# Patient Record
Sex: FEMALE | Race: OTHER | HISPANIC OR LATINO | URBAN - METROPOLITAN AREA
[De-identification: names, ages, dates, MRNs, and addresses within clinical notes are randomized per-mention and may not be internally consistent; named-entity substitution may affect disease eponyms.]

---

## 2024-01-07 ENCOUNTER — EMERGENCY (EMERGENCY)
Facility: HOSPITAL | Age: 19
LOS: 1 days | Discharge: HOME | End: 2024-01-07
Attending: STUDENT IN AN ORGANIZED HEALTH CARE EDUCATION/TRAINING PROGRAM | Admitting: INTERNAL MEDICINE
Payer: MEDICAID

## 2024-01-07 VITALS
DIASTOLIC BLOOD PRESSURE: 68 MMHG | HEART RATE: 83 BPM | RESPIRATION RATE: 17 BRPM | SYSTOLIC BLOOD PRESSURE: 108 MMHG | OXYGEN SATURATION: 98 %

## 2024-01-07 VITALS
WEIGHT: 102.07 LBS | OXYGEN SATURATION: 98 % | SYSTOLIC BLOOD PRESSURE: 122 MMHG | RESPIRATION RATE: 18 BRPM | HEART RATE: 123 BPM | TEMPERATURE: 98 F | DIASTOLIC BLOOD PRESSURE: 86 MMHG

## 2024-01-07 LAB
ALBUMIN SERPL ELPH-MCNC: 4.2 G/DL — SIGNIFICANT CHANGE UP (ref 3.3–5)
ALBUMIN SERPL ELPH-MCNC: 4.2 G/DL — SIGNIFICANT CHANGE UP (ref 3.3–5)
ALP SERPL-CCNC: 87 U/L — SIGNIFICANT CHANGE UP (ref 40–120)
ALP SERPL-CCNC: 87 U/L — SIGNIFICANT CHANGE UP (ref 40–120)
ALT FLD-CCNC: 25 U/L — SIGNIFICANT CHANGE UP (ref 10–45)
ALT FLD-CCNC: 25 U/L — SIGNIFICANT CHANGE UP (ref 10–45)
ANION GAP SERPL CALC-SCNC: 11 MMOL/L — SIGNIFICANT CHANGE UP (ref 5–17)
ANION GAP SERPL CALC-SCNC: 11 MMOL/L — SIGNIFICANT CHANGE UP (ref 5–17)
AST SERPL-CCNC: 17 U/L — SIGNIFICANT CHANGE UP (ref 10–40)
AST SERPL-CCNC: 17 U/L — SIGNIFICANT CHANGE UP (ref 10–40)
BASOPHILS # BLD AUTO: 0.03 K/UL — SIGNIFICANT CHANGE UP (ref 0–0.2)
BASOPHILS # BLD AUTO: 0.03 K/UL — SIGNIFICANT CHANGE UP (ref 0–0.2)
BASOPHILS NFR BLD AUTO: 0.4 % — SIGNIFICANT CHANGE UP (ref 0–2)
BASOPHILS NFR BLD AUTO: 0.4 % — SIGNIFICANT CHANGE UP (ref 0–2)
BILIRUB SERPL-MCNC: 0.4 MG/DL — SIGNIFICANT CHANGE UP (ref 0.2–1.2)
BILIRUB SERPL-MCNC: 0.4 MG/DL — SIGNIFICANT CHANGE UP (ref 0.2–1.2)
BUN SERPL-MCNC: 10 MG/DL — SIGNIFICANT CHANGE UP (ref 7–23)
BUN SERPL-MCNC: 10 MG/DL — SIGNIFICANT CHANGE UP (ref 7–23)
CALCIUM SERPL-MCNC: 9.4 MG/DL — SIGNIFICANT CHANGE UP (ref 8.4–10.5)
CALCIUM SERPL-MCNC: 9.4 MG/DL — SIGNIFICANT CHANGE UP (ref 8.4–10.5)
CHLORIDE SERPL-SCNC: 105 MMOL/L — SIGNIFICANT CHANGE UP (ref 96–108)
CHLORIDE SERPL-SCNC: 105 MMOL/L — SIGNIFICANT CHANGE UP (ref 96–108)
CO2 SERPL-SCNC: 26 MMOL/L — SIGNIFICANT CHANGE UP (ref 22–31)
CO2 SERPL-SCNC: 26 MMOL/L — SIGNIFICANT CHANGE UP (ref 22–31)
CREAT SERPL-MCNC: 0.78 MG/DL — SIGNIFICANT CHANGE UP (ref 0.5–1.3)
CREAT SERPL-MCNC: 0.78 MG/DL — SIGNIFICANT CHANGE UP (ref 0.5–1.3)
EGFR: 112 ML/MIN/1.73M2 — SIGNIFICANT CHANGE UP
EGFR: 112 ML/MIN/1.73M2 — SIGNIFICANT CHANGE UP
EOSINOPHIL # BLD AUTO: 0.24 K/UL — SIGNIFICANT CHANGE UP (ref 0–0.5)
EOSINOPHIL # BLD AUTO: 0.24 K/UL — SIGNIFICANT CHANGE UP (ref 0–0.5)
EOSINOPHIL NFR BLD AUTO: 3.3 % — SIGNIFICANT CHANGE UP (ref 0–6)
EOSINOPHIL NFR BLD AUTO: 3.3 % — SIGNIFICANT CHANGE UP (ref 0–6)
GLUCOSE SERPL-MCNC: 87 MG/DL — SIGNIFICANT CHANGE UP (ref 70–99)
GLUCOSE SERPL-MCNC: 87 MG/DL — SIGNIFICANT CHANGE UP (ref 70–99)
HCG SERPL-ACNC: <1 MIU/ML — SIGNIFICANT CHANGE UP
HCG SERPL-ACNC: <1 MIU/ML — SIGNIFICANT CHANGE UP
HCT VFR BLD CALC: 39 % — SIGNIFICANT CHANGE UP (ref 34.5–45)
HCT VFR BLD CALC: 39 % — SIGNIFICANT CHANGE UP (ref 34.5–45)
HGB BLD-MCNC: 13.2 G/DL — SIGNIFICANT CHANGE UP (ref 11.5–15.5)
HGB BLD-MCNC: 13.2 G/DL — SIGNIFICANT CHANGE UP (ref 11.5–15.5)
IMM GRANULOCYTES NFR BLD AUTO: 0.3 % — SIGNIFICANT CHANGE UP (ref 0–0.9)
IMM GRANULOCYTES NFR BLD AUTO: 0.3 % — SIGNIFICANT CHANGE UP (ref 0–0.9)
LIDOCAIN IGE QN: 38 U/L — SIGNIFICANT CHANGE UP (ref 16–77)
LIDOCAIN IGE QN: 38 U/L — SIGNIFICANT CHANGE UP (ref 16–77)
LYMPHOCYTES # BLD AUTO: 1.5 K/UL — SIGNIFICANT CHANGE UP (ref 1–3.3)
LYMPHOCYTES # BLD AUTO: 1.5 K/UL — SIGNIFICANT CHANGE UP (ref 1–3.3)
LYMPHOCYTES # BLD AUTO: 20.7 % — SIGNIFICANT CHANGE UP (ref 13–44)
LYMPHOCYTES # BLD AUTO: 20.7 % — SIGNIFICANT CHANGE UP (ref 13–44)
MCHC RBC-ENTMCNC: 29.3 PG — SIGNIFICANT CHANGE UP (ref 27–34)
MCHC RBC-ENTMCNC: 29.3 PG — SIGNIFICANT CHANGE UP (ref 27–34)
MCHC RBC-ENTMCNC: 33.8 GM/DL — SIGNIFICANT CHANGE UP (ref 32–36)
MCHC RBC-ENTMCNC: 33.8 GM/DL — SIGNIFICANT CHANGE UP (ref 32–36)
MCV RBC AUTO: 86.5 FL — SIGNIFICANT CHANGE UP (ref 80–100)
MCV RBC AUTO: 86.5 FL — SIGNIFICANT CHANGE UP (ref 80–100)
MONOCYTES # BLD AUTO: 0.72 K/UL — SIGNIFICANT CHANGE UP (ref 0–0.9)
MONOCYTES # BLD AUTO: 0.72 K/UL — SIGNIFICANT CHANGE UP (ref 0–0.9)
MONOCYTES NFR BLD AUTO: 9.9 % — SIGNIFICANT CHANGE UP (ref 2–14)
MONOCYTES NFR BLD AUTO: 9.9 % — SIGNIFICANT CHANGE UP (ref 2–14)
NEUTROPHILS # BLD AUTO: 4.75 K/UL — SIGNIFICANT CHANGE UP (ref 1.8–7.4)
NEUTROPHILS # BLD AUTO: 4.75 K/UL — SIGNIFICANT CHANGE UP (ref 1.8–7.4)
NEUTROPHILS NFR BLD AUTO: 65.4 % — SIGNIFICANT CHANGE UP (ref 43–77)
NEUTROPHILS NFR BLD AUTO: 65.4 % — SIGNIFICANT CHANGE UP (ref 43–77)
NRBC # BLD: 0 /100 WBCS — SIGNIFICANT CHANGE UP (ref 0–0)
NRBC # BLD: 0 /100 WBCS — SIGNIFICANT CHANGE UP (ref 0–0)
PLATELET # BLD AUTO: 223 K/UL — SIGNIFICANT CHANGE UP (ref 150–400)
PLATELET # BLD AUTO: 223 K/UL — SIGNIFICANT CHANGE UP (ref 150–400)
POTASSIUM SERPL-MCNC: 3.4 MMOL/L — LOW (ref 3.5–5.3)
POTASSIUM SERPL-MCNC: 3.4 MMOL/L — LOW (ref 3.5–5.3)
POTASSIUM SERPL-SCNC: 3.4 MMOL/L — LOW (ref 3.5–5.3)
POTASSIUM SERPL-SCNC: 3.4 MMOL/L — LOW (ref 3.5–5.3)
PROT SERPL-MCNC: 7.7 G/DL — SIGNIFICANT CHANGE UP (ref 6–8.3)
PROT SERPL-MCNC: 7.7 G/DL — SIGNIFICANT CHANGE UP (ref 6–8.3)
RBC # BLD: 4.51 M/UL — SIGNIFICANT CHANGE UP (ref 3.8–5.2)
RBC # BLD: 4.51 M/UL — SIGNIFICANT CHANGE UP (ref 3.8–5.2)
RBC # FLD: 12.9 % — SIGNIFICANT CHANGE UP (ref 10.3–14.5)
RBC # FLD: 12.9 % — SIGNIFICANT CHANGE UP (ref 10.3–14.5)
SODIUM SERPL-SCNC: 142 MMOL/L — SIGNIFICANT CHANGE UP (ref 135–145)
SODIUM SERPL-SCNC: 142 MMOL/L — SIGNIFICANT CHANGE UP (ref 135–145)
TROPONIN I, HIGH SENSITIVITY RESULT: <4 NG/L — SIGNIFICANT CHANGE UP
TROPONIN I, HIGH SENSITIVITY RESULT: <4 NG/L — SIGNIFICANT CHANGE UP
WBC # BLD: 7.26 K/UL — SIGNIFICANT CHANGE UP (ref 3.8–10.5)
WBC # BLD: 7.26 K/UL — SIGNIFICANT CHANGE UP (ref 3.8–10.5)
WBC # FLD AUTO: 7.26 K/UL — SIGNIFICANT CHANGE UP (ref 3.8–10.5)
WBC # FLD AUTO: 7.26 K/UL — SIGNIFICANT CHANGE UP (ref 3.8–10.5)

## 2024-01-07 PROCEDURE — 83690 ASSAY OF LIPASE: CPT

## 2024-01-07 PROCEDURE — 84702 CHORIONIC GONADOTROPIN TEST: CPT

## 2024-01-07 PROCEDURE — 93010 ELECTROCARDIOGRAM REPORT: CPT

## 2024-01-07 PROCEDURE — 84484 ASSAY OF TROPONIN QUANT: CPT

## 2024-01-07 PROCEDURE — 85025 COMPLETE CBC W/AUTO DIFF WBC: CPT

## 2024-01-07 PROCEDURE — 93005 ELECTROCARDIOGRAM TRACING: CPT

## 2024-01-07 PROCEDURE — 99285 EMERGENCY DEPT VISIT HI MDM: CPT

## 2024-01-07 PROCEDURE — 80053 COMPREHEN METABOLIC PANEL: CPT

## 2024-01-07 PROCEDURE — 36415 COLL VENOUS BLD VENIPUNCTURE: CPT

## 2024-01-07 PROCEDURE — 99284 EMERGENCY DEPT VISIT MOD MDM: CPT | Mod: 25

## 2024-01-07 RX ORDER — SODIUM CHLORIDE 9 MG/ML
1000 INJECTION INTRAMUSCULAR; INTRAVENOUS; SUBCUTANEOUS ONCE
Refills: 0 | Status: COMPLETED | OUTPATIENT
Start: 2024-01-07 | End: 2024-01-07

## 2024-01-07 RX ADMIN — SODIUM CHLORIDE 1000 MILLILITER(S): 9 INJECTION INTRAMUSCULAR; INTRAVENOUS; SUBCUTANEOUS at 14:15

## 2024-01-07 NOTE — ED PROVIDER NOTE - PHYSICAL EXAMINATION
VITAL SIGNS: I have reviewed nursing notes and confirm.   GEN: Well-developed; well-nourished; in no acute distress. Speaking full sentences.  SKIN: Warm, pink, no rash, no diaphoresis, no cyanosis, well perfused.   HEAD: Normocephalic; atraumatic. No scalp lacerations, no abrasions.  NECK: Supple; non tender.   EYES: Pupils 3mm equal, round, reactive to light and accomodation, conjunctiva and sclera clear.   ENT: No nasal discharge; airway clear. Trachea is midline. Normal dentition.  CV: RRR. S1, S2 normal; no murmurs, gallops, or rubs. Capillary refill < 2 seconds throughout. Distal pulses intact 2+ throughout.  RESP: CTA bilaterally. No wheezes, rales, or rhonchi.   ABD: Normal bowel sounds, soft, non-distended, non-tender, no rebound, no guarding, no rigidity,    MSK: Normal range of motion and movement of all 4 extremities.     BACK: No thoracolumbar midline or paravertebral tenderness.    NEURO: Alert & oriented x 3,  Gait: Fluid. Normal speech and coordination.

## 2024-01-07 NOTE — ED PROVIDER NOTE - OBJECTIVE STATEMENT
18f no pmhx presenting with left sided chest pain x 1 day. Describes left sided chest pain, radiating to back, sharp, mild, lasting few minutes, no exacerbated factors. ROS: Otherwise, (-) known family history of early AMI in first degree relative < 54 y/o, (-) tearing or ripping quality, (-) diaphoresis, (-) exertional component, (-) pleuritic component, (-) positional component, (-) abdominal pain, (-) nausea/vomiting, (-) fevers/chills, (-) recent illness, (-) traumatic injury,  (-) shortness of breath, (-) coughing, (-) prior cardiac history, (-) tobacco, (-) IVDU or cocaine use. Denies recent travel, recent surgery, immobility, extremity swelling, hemoptysis, hormone use, known personal cancer history, or personal/family history of blood clots. 18f no pmhx presenting with left sided chest pain x 1 day. Describes left sided chest pain, radiating to back, sharp, mild, lasting few minutes, no exacerbated factors. ROS: Otherwise, (-) known family history of early AMI in first degree relative < 56 y/o, (-) tearing or ripping quality, (-) diaphoresis, (-) exertional component, (-) pleuritic component, (-) positional component, (-) abdominal pain, (-) nausea/vomiting, (-) fevers/chills, (-) recent illness, (-) traumatic injury,  (-) shortness of breath, (-) coughing, (-) prior cardiac history, (-) tobacco, (-) IVDU or cocaine use. Denies recent travel, recent surgery, immobility, extremity swelling, hemoptysis, hormone use, known personal cancer history, or personal/family history of blood clots.

## 2024-01-07 NOTE — ED ADULT TRIAGE NOTE - SOURCE OF INFORMATION
"Does patient have a Guardian or POA: No  Procedure: Colonoscopy  Diagnosis: Positive colorectal cancer screening using cologuard test:  R19.5  Prep: Miralax    Previous scope: 12/27/2006  Allergies:  ALLERGIES:  Lisinopril-hydrochlorothiazide    Medication Review:   Anticoagulants: This patient does not have an active medication from one of the medication groupers. NSAIDS: Yes  Diabetic Medications: None  Phentermine: No   Antihypertensives: Yes  Iron Supplement:  No   Fish Oil:  No     Medical History Review:  Cardiac Stent Placement: No  Pacemaker NO  Defibrillator: No  Spinal Cord Stimulator: No   BMI over 40: No  Estimated body mass index is 34.4 kg/mÂ² as calculated from the following:    Height as of 11/21/23: 5' 10.5"" (1.791 m). Weight as of 11/21/23: 110.3 kg (243 lb 3.2 oz). Sleep Apnea: No     Family History: Colon Cancer  No    Colonoscopy scheduled per protocol and instructions reviewed, patient verbalized understanding. The patient has been instructed to hold certain medications prior to this procedure and to check with prescribing provider to make sure it would be ok to hold as stated in instructions listed in letter tab.     Procedure Date: 2/8/2024  Procedure Time: 9:00am  Arrival Time: 8:00am  Delivery Instructions: Via Mail          " Patient

## 2024-01-07 NOTE — ED ADULT NURSE NOTE - NSFALLUNIVINTERV_ED_ALL_ED
Bed/Stretcher in lowest position, wheels locked, appropriate side rails in place/Call bell, personal items and telephone in reach/Instruct patient to call for assistance before getting out of bed/chair/stretcher/Non-slip footwear applied when patient is off stretcher/[place] to call system/Physically safe environment - no spills, clutter or unnecessary equipment/Purposeful proactive rounding/Room/bathroom lighting operational, light cord in reach Bed/Stretcher in lowest position, wheels locked, appropriate side rails in place/Call bell, personal items and telephone in reach/Instruct patient to call for assistance before getting out of bed/chair/stretcher/Non-slip footwear applied when patient is off stretcher/[place] to call system/Physically safe environment - no spills, clutter or unnecessary equipment/Purposeful proactive rounding/Room/bathroom lighting operational, light cord in reach

## 2024-01-07 NOTE — ED PROVIDER NOTE - CLINICAL SUMMARY MEDICAL DECISION MAKING FREE TEXT BOX
18f no pmhx presenting with left sided chest pain x 1 day. Describes left sided chest pain, radiating to back, sharp, mild, lasting few minutes, no exacerbated factors. ROS: Otherwise, (-) known family history of early AMI in first degree relative < 54 y/o, (-) tearing or ripping quality, (-) diaphoresis, (-) exertional component, (-) pleuritic component, (-) positional component, (-) abdominal pain, (-) nausea/vomiting, (-) fevers/chills, (-) recent illness, (-) traumatic injury,  (-) shortness of breath, (-) coughing, (-) prior cardiac history, (-) tobacco, (-) IVDU or cocaine use. Denies recent travel, recent surgery, immobility, extremity swelling, hemoptysis, hormone use, known personal cancer history, or personal/family history of blood clots.     History obtained from independent historian: na  External note reviewed: na  DDx: costochondritis likely, rule out ACS low risk.  Decision making, ED course, independent interpretation of imaging studies, and consults:    Patient presenting with non-cardiac chest pain that is not consistent with acute coronary syndrome/NSTEMI based on history and absence of high-risk factors (i.e. not substernal, no exertional component, not relieved with rest, XXX ). Patient with minimal CAD risk factors (including age < 45 years),  Given the timing of the pain to emergency room presentation, plan to send single troponin to evaluate for NSTEMI.   PLAN:  - CBC, CMP, Troponin, PT/PTT/INR, EKG, CXR, Pain control PRN, serial re-assessment  - HEART SCORE calculation w/ Disposition accordingly 18f no pmhx presenting with left sided chest pain x 1 day. Describes left sided chest pain, radiating to back, sharp, mild, lasting few minutes, no exacerbated factors. ROS: Otherwise, (-) known family history of early AMI in first degree relative < 56 y/o, (-) tearing or ripping quality, (-) diaphoresis, (-) exertional component, (-) pleuritic component, (-) positional component, (-) abdominal pain, (-) nausea/vomiting, (-) fevers/chills, (-) recent illness, (-) traumatic injury,  (-) shortness of breath, (-) coughing, (-) prior cardiac history, (-) tobacco, (-) IVDU or cocaine use. Denies recent travel, recent surgery, immobility, extremity swelling, hemoptysis, hormone use, known personal cancer history, or personal/family history of blood clots.     History obtained from independent historian: na  External note reviewed: na  DDx: costochondritis likely, rule out ACS low risk.  Decision making, ED course, independent interpretation of imaging studies, and consults:    Patient presenting with non-cardiac chest pain that is not consistent with acute coronary syndrome/NSTEMI based on history and absence of high-risk factors (i.e. not substernal, no exertional component, not relieved with rest, XXX ). Patient with minimal CAD risk factors (including age < 45 years),  Given the timing of the pain to emergency room presentation, plan to send single troponin to evaluate for NSTEMI.   PLAN:  - CBC, CMP, Troponin, PT/PTT/INR, EKG, CXR, Pain control PRN, serial re-assessment  - HEART SCORE calculation w/ Disposition accordingly

## 2024-01-07 NOTE — ED PROVIDER NOTE - NSFOLLOWUPINSTRUCTIONS_ED_ALL_ED_FT
Dolor en el pecho    El dolor de pecho puede ser causado por muchas condiciones diferentes que pueden ser peligrosas o no. Las causas incluyen acidez de estómago, infecciones pulmonares, ataques cardíacos, coágulos de jani en los pulmones, infecciones de la piel, tensión o daño a los músculos, cartílagos o huesos, etc. Además de los antecedentes y el examen físico, es posible que se realice un electrocardiograma (ECG) u otras pruebas de laboratorio. Se woodruff realizado pruebas para determinar la causa de sousa dolor en el pecho. Jabari un seguimiento con sousa proveedor de atención primaria o con un cardiólogo según las instrucciones.    BUSQUE ATENCIÓN MÉDICA INMEDIATA SI TIENE ALGUNO DE LOS SIGUIENTES SÍNTOMAS: empeoramiento del dolor en el pecho, tos con jani, dolor inexplicable de espalda/johnnie/mandíbula, dolor abdominal intenso, mareos o aturdimiento, desmayos, dificultad para respirar, piel sudorosa o húmeda, vómitos, o latidos cardíacos acelerados. Estos síntomas pueden representar un problema grave que constituye kyaw emergencia. No espere a edgar si los síntomas desaparecen. Obtenga ayuda médica de inmediato. Llame al 911 y no conduzca hasta el hospital.    [place] acetaminofén 650 mg por vía oral cada 6 a 8 horas para controlar el dolor según sea necesario. No exceda los 4000 mg de paracetamol janna un período de 24 horas. El acetaminofén se puede encontrar en muchos medicamentos para el resfriado de venta krystle, así madelyn en medicamentos opioides que se pueden administrar para el dolor.    [place] ibuprofeno (también conocido madelyn MOTRIN o ADVIL) 400 mg por vía oral cada 6 a 8 horas para controlar el dolor según sea necesario con alimentos para evitar malestar estomacal. El ibuprofeno se puede encontrar en muchos medicamentos de venta krystle. No tome ibuprofeno si tiene un trastorno hemorrágico, úlcera estomacal o gastrointestinal o enfermedad hepática.    Si es necesario, puede alternar estos medicamentos para poder alanna un medicamento cada 3 horas. Por ejemplo, al mediodía tome ibuprofeno, luego a las 3 p. m. tome paracetamol y luego a las 6 p. m. tome ibuprofeno.      Chest Pain    Chest pain can be caused by many different conditions which may or may not be dangerous. Causes include heartburn, lung infections, heart attack, blood clot in lungs, skin infections, strain or damage to muscle, cartilage, or bones, etc. In addition to a history and physical examination, an electrocardiogram (ECG) or other lab tests may have been performed to determine the cause of your chest pain. Follow up with your primary care provider or with a cardiologist as instructed.     SEEK IMMEDIATE MEDICAL CARE IF YOU HAVE ANY OF THE FOLLOWING SYMPTOMS: worsening chest pain, coughing up blood, unexplained back/neck/jaw pain, severe abdominal pain, dizziness or lightheadedness, fainting, shortness of breath, sweaty or clammy skin, vomiting, or racing heart beat. These symptoms may represent a serious problem that is an emergency. Do not wait to see if the symptoms will go away. Get medical help right away. Call 911 and do not drive yourself to the hospital.     Take acetaminophen 650 mg orally every 6-8 hours for pain control as needed. Please do not exceed 4,000 mg of acetaminophen during a 24 hours period. Acetaminophen can be found in many over-the-counter cold medications as well as opioid medications that may be given for pain.    Take ibuprofen (also known as MOTRIN or ADVIL) 400 mg orally every 6-8 hours for pain control as needed with food to avoid an upset stomach. Ibuprofen can be found in many over-the-counter medications. Please do not take ibuprofen if you have a bleeding disorder, stomach or gastrointestinal ulcer, or liver disease.    If needed, you can alternate these medications so that you can take one medication every 3 hours. For example, at noon take ibuprofen, then at 3PM take acetaminophen, then at 6PM take ibuprofen. Dolor en el pecho    El dolor de pecho puede ser causado por muchas condiciones diferentes que pueden ser peligrosas o no. Las causas incluyen acidez de estómago, infecciones pulmonares, ataques cardíacos, coágulos de jani en los pulmones, infecciones de la piel, tensión o daño a los músculos, cartílagos o huesos, etc. Además de los antecedentes y el examen físico, es posible que se realice un electrocardiograma (ECG) u otras pruebas de laboratorio. Se woodruff realizado pruebas para determinar la causa de sousa dolor en el pecho. Jabari un seguimiento con sousa proveedor de atención primaria o con un cardiólogo según las instrucciones.    BUSQUE ATENCIÓN MÉDICA INMEDIATA SI TIENE ALGUNO DE LOS SIGUIENTES SÍNTOMAS: empeoramiento del dolor en el pecho, tos con jani, dolor inexplicable de espalda/johnnie/mandíbula, dolor abdominal intenso, mareos o aturdimiento, desmayos, dificultad para respirar, piel sudorosa o húmeda, vómitos, o latidos cardíacos acelerados. Estos síntomas pueden representar un problema grave que constituye kyaw emergencia. No espere a edgar si los síntomas desaparecen. Obtenga ayuda médica de inmediato. Llame al 911 y no conduzca hasta el hospital.    [employer] acetaminofén 650 mg por vía oral cada 6 a 8 horas para controlar el dolor según sea necesario. No exceda los 4000 mg de paracetamol janna un período de 24 horas. El acetaminofén se puede encontrar en muchos medicamentos para el resfriado de venta krystle, así madelyn en medicamentos opioides que se pueden administrar para el dolor.    [employer] ibuprofeno (también conocido madelyn MOTRIN o ADVIL) 400 mg por vía oral cada 6 a 8 horas para controlar el dolor según sea necesario con alimentos para evitar malestar estomacal. El ibuprofeno se puede encontrar en muchos medicamentos de venta krystle. No tome ibuprofeno si tiene un trastorno hemorrágico, úlcera estomacal o gastrointestinal o enfermedad hepática.    Si es necesario, puede alternar estos medicamentos para poder alanna un medicamento cada 3 horas. Por ejemplo, al mediodía tome ibuprofeno, luego a las 3 p. m. tome paracetamol y luego a las 6 p. m. tome ibuprofeno.      Chest Pain    Chest pain can be caused by many different conditions which may or may not be dangerous. Causes include heartburn, lung infections, heart attack, blood clot in lungs, skin infections, strain or damage to muscle, cartilage, or bones, etc. In addition to a history and physical examination, an electrocardiogram (ECG) or other lab tests may have been performed to determine the cause of your chest pain. Follow up with your primary care provider or with a cardiologist as instructed.     SEEK IMMEDIATE MEDICAL CARE IF YOU HAVE ANY OF THE FOLLOWING SYMPTOMS: worsening chest pain, coughing up blood, unexplained back/neck/jaw pain, severe abdominal pain, dizziness or lightheadedness, fainting, shortness of breath, sweaty or clammy skin, vomiting, or racing heart beat. These symptoms may represent a serious problem that is an emergency. Do not wait to see if the symptoms will go away. Get medical help right away. Call 911 and do not drive yourself to the hospital.     Take acetaminophen 650 mg orally every 6-8 hours for pain control as needed. Please do not exceed 4,000 mg of acetaminophen during a 24 hours period. Acetaminophen can be found in many over-the-counter cold medications as well as opioid medications that may be given for pain.    Take ibuprofen (also known as MOTRIN or ADVIL) 400 mg orally every 6-8 hours for pain control as needed with food to avoid an upset stomach. Ibuprofen can be found in many over-the-counter medications. Please do not take ibuprofen if you have a bleeding disorder, stomach or gastrointestinal ulcer, or liver disease.    If needed, you can alternate these medications so that you can take one medication every 3 hours. For example, at noon take ibuprofen, then at 3PM take acetaminophen, then at 6PM take ibuprofen.

## 2024-01-07 NOTE — ED PROVIDER NOTE - PATIENT PORTAL LINK FT
You can access the FollowMyHealth Patient Portal offered by [facility] by registering at the following website: [email or web address]. By joining [employer]’s FollowMyHealth portal, you will also be able to view your health information using other applications (apps) compatible with our system. You can access the FollowMyHealth Patient Portal offered by [facility] by registering at the following website: [email or web address]. By joining [employer]’s FollowMyHealth portal, you will also be able to view your health information using other applications (apps) compatible with our system.

## 2024-01-24 ENCOUNTER — OUTPATIENT (OUTPATIENT)
Dept: OUTPATIENT SERVICES | Facility: HOSPITAL | Age: 19
LOS: 1 days | End: 2024-01-24
Payer: SELF-PAY

## 2024-01-24 ENCOUNTER — APPOINTMENT (OUTPATIENT)
Dept: FAMILY MEDICINE | Facility: HOSPITAL | Age: 19
End: 2024-01-24
Payer: SELF-PAY

## 2024-01-24 VITALS
OXYGEN SATURATION: 98 % | HEART RATE: 94 BPM | DIASTOLIC BLOOD PRESSURE: 73 MMHG | TEMPERATURE: 98 F | SYSTOLIC BLOOD PRESSURE: 106 MMHG | WEIGHT: 105 LBS | RESPIRATION RATE: 16 BRPM | HEIGHT: 60 IN | BODY MASS INDEX: 20.62 KG/M2

## 2024-01-24 DIAGNOSIS — Z00.00 ENCOUNTER FOR GENERAL ADULT MEDICAL EXAMINATION WITHOUT ABNORMAL FINDINGS: ICD-10-CM

## 2024-01-24 DIAGNOSIS — R07.9 CHEST PAIN, UNSPECIFIED: ICD-10-CM

## 2024-01-24 DIAGNOSIS — R51.9 HEADACHE, UNSPECIFIED: ICD-10-CM

## 2024-01-24 PROCEDURE — 93010 ELECTROCARDIOGRAM REPORT: CPT

## 2024-01-24 PROCEDURE — 93005 ELECTROCARDIOGRAM TRACING: CPT

## 2024-01-24 PROCEDURE — G0463: CPT

## 2024-01-28 NOTE — HEALTH RISK ASSESSMENT
[No] : No [0] : 2) Feeling down, depressed, or hopeless: Not at all (0) [PHQ-2 Negative - No further assessment needed] : PHQ-2 Negative - No further assessment needed [Never] : Never [[id number]] : 0

## 2024-01-28 NOTE — REVIEW OF SYSTEMS
[Chest Pain] : chest pain [Palpitations] : palpitations [Leg Claudication] : no leg claudication [Lower Ext Edema] : no lower extremity edema [Orthopnea] : no orthopnea [Paroxysmal Nocturnal Dyspnea] : no paroxysmal nocturnal dyspnea [Headache] : headache [Dizziness] : no dizziness [Fainting] : no fainting [Confusion] : no confusion [Memory Loss] : no memory loss [Unsteady Walking] : no ataxia [Negative] : Heme/Lymph

## 2024-01-28 NOTE — HISTORY OF PRESENT ILLNESS
[FreeTextEntry8] : A 19-year-old female presented to the clinic for a follow-up after a recent visit to the emergency department . The patient reported experiencing frequent episodes of left-sided chest pain, lasting for 2-3 minutes, which are stabbing in nature and radiate to the left upper back. She recently visited the ED for the same issue and was discharged with pain medication and advised to follow up with her primary care physician . Additionally, the patient complained of episodic headaches located in the frontal and temporal areas, lasting for a few days, and improving with Tylenol. The patient has no family history of heart disease, does not smoke, and does not use drugs.

## 2024-02-15 ENCOUNTER — RESULT CHARGE (OUTPATIENT)
Age: 19
End: 2024-02-15

## 2024-02-16 ENCOUNTER — OUTPATIENT (OUTPATIENT)
Dept: OUTPATIENT SERVICES | Facility: HOSPITAL | Age: 19
LOS: 1 days | End: 2024-02-16
Payer: SELF-PAY

## 2024-02-16 ENCOUNTER — APPOINTMENT (OUTPATIENT)
Dept: CARDIOLOGY | Facility: HOSPITAL | Age: 19
End: 2024-02-16
Payer: COMMERCIAL

## 2024-02-16 VITALS
WEIGHT: 104 LBS | OXYGEN SATURATION: 98 % | SYSTOLIC BLOOD PRESSURE: 118 MMHG | TEMPERATURE: 97.7 F | HEART RATE: 69 BPM | DIASTOLIC BLOOD PRESSURE: 69 MMHG | RESPIRATION RATE: 16 BRPM

## 2024-02-16 DIAGNOSIS — R51.9 HEADACHE, UNSPECIFIED: ICD-10-CM

## 2024-02-16 DIAGNOSIS — R07.9 CHEST PAIN, UNSPECIFIED: ICD-10-CM

## 2024-02-16 DIAGNOSIS — Z00.00 ENCOUNTER FOR GENERAL ADULT MEDICAL EXAMINATION WITHOUT ABNORMAL FINDINGS: ICD-10-CM

## 2024-02-16 PROCEDURE — 93010 ELECTROCARDIOGRAM REPORT: CPT

## 2024-02-16 PROCEDURE — 93005 ELECTROCARDIOGRAM TRACING: CPT

## 2024-02-16 PROCEDURE — G0463: CPT

## 2024-03-08 ENCOUNTER — OUTPATIENT (OUTPATIENT)
Dept: OUTPATIENT SERVICES | Facility: HOSPITAL | Age: 19
LOS: 1 days | End: 2024-03-08
Payer: SELF-PAY

## 2024-03-08 ENCOUNTER — RESULT REVIEW (OUTPATIENT)
Age: 19
End: 2024-03-08

## 2024-03-08 DIAGNOSIS — Z00.00 ENCOUNTER FOR GENERAL ADULT MEDICAL EXAMINATION WITHOUT ABNORMAL FINDINGS: ICD-10-CM

## 2024-03-08 DIAGNOSIS — R07.9 CHEST PAIN, UNSPECIFIED: ICD-10-CM

## 2024-03-08 PROCEDURE — 93306 TTE W/DOPPLER COMPLETE: CPT | Mod: 26

## 2024-03-08 PROCEDURE — 93306 TTE W/DOPPLER COMPLETE: CPT

## 2024-03-22 ENCOUNTER — OUTPATIENT (OUTPATIENT)
Dept: OUTPATIENT SERVICES | Facility: HOSPITAL | Age: 19
LOS: 1 days | End: 2024-03-22
Payer: SELF-PAY

## 2024-03-22 ENCOUNTER — APPOINTMENT (OUTPATIENT)
Dept: FAMILY MEDICINE | Facility: HOSPITAL | Age: 19
End: 2024-03-22

## 2024-03-22 ENCOUNTER — RESULT REVIEW (OUTPATIENT)
Age: 19
End: 2024-03-22

## 2024-03-22 VITALS
TEMPERATURE: 97.9 F | DIASTOLIC BLOOD PRESSURE: 70 MMHG | OXYGEN SATURATION: 99 % | WEIGHT: 112 LBS | HEART RATE: 78 BPM | SYSTOLIC BLOOD PRESSURE: 109 MMHG | RESPIRATION RATE: 14 BRPM

## 2024-03-22 DIAGNOSIS — Z00.00 ENCOUNTER FOR GENERAL ADULT MEDICAL EXAMINATION WITHOUT ABNORMAL FINDINGS: ICD-10-CM

## 2024-03-22 DIAGNOSIS — R10.0 ACUTE ABDOMEN: ICD-10-CM

## 2024-03-22 DIAGNOSIS — Z00.00 ENCOUNTER FOR GENERAL ADULT MEDICAL EXAMINATION W/OUT ABNORMAL FINDINGS: ICD-10-CM

## 2024-03-22 DIAGNOSIS — N97.0 FEMALE INFERTILITY ASSOCIATED WITH ANOVULATION: ICD-10-CM

## 2024-03-22 DIAGNOSIS — R30.0 DYSURIA: ICD-10-CM

## 2024-03-22 PROCEDURE — 85025 COMPLETE CBC W/AUTO DIFF WBC: CPT

## 2024-03-22 PROCEDURE — 80053 COMPREHEN METABOLIC PANEL: CPT

## 2024-03-22 PROCEDURE — G0463: CPT

## 2024-03-22 PROCEDURE — 87800 DETECT AGNT MULT DNA DIREC: CPT

## 2024-03-22 PROCEDURE — 87086 URINE CULTURE/COLONY COUNT: CPT

## 2024-03-25 PROBLEM — R30.0 DYSURIA: Status: ACTIVE | Noted: 2024-03-22

## 2024-03-25 LAB
ALBUMIN SERPL ELPH-MCNC: 4.9 G/DL
ALP BLD-CCNC: 97 U/L
ALT SERPL-CCNC: 17 U/L
ANION GAP SERPL CALC-SCNC: 14 MMOL/L
AST SERPL-CCNC: 17 U/L
BACTERIA UR CULT: NORMAL
BASOPHILS # BLD AUTO: 0.04 K/UL
BASOPHILS NFR BLD AUTO: 0.5 %
BILIRUB SERPL-MCNC: 0.3 MG/DL
BILIRUB UR QL STRIP: NORMAL
BUN SERPL-MCNC: 8 MG/DL
CALCIUM SERPL-MCNC: 9.7 MG/DL
CANDIDA VAG CYTO: NOT DETECTED
CHLORIDE SERPL-SCNC: 104 MMOL/L
CLARITY UR: CLEAR
CO2 SERPL-SCNC: 21 MMOL/L
COLLECTION METHOD: NORMAL
CREAT SERPL-MCNC: 0.64 MG/DL
EGFR: 129 ML/MIN/1.73M2
EOSINOPHIL # BLD AUTO: 0.23 K/UL
EOSINOPHIL NFR BLD AUTO: 2.9 %
G VAGINALIS+PREV SP MTYP VAG QL MICRO: NOT DETECTED
GLUCOSE SERPL-MCNC: 86 MG/DL
GLUCOSE UR-MCNC: NORMAL
HCG UR QL: 0.2 EU/DL
HCT VFR BLD CALC: 39.1 %
HGB BLD-MCNC: 12.9 G/DL
HGB UR QL STRIP.AUTO: NORMAL
IMM GRANULOCYTES NFR BLD AUTO: 0.3 %
KETONES UR-MCNC: NORMAL
LEUKOCYTE ESTERASE UR QL STRIP: NORMAL
LYMPHOCYTES # BLD AUTO: 1.77 K/UL
LYMPHOCYTES NFR BLD AUTO: 22.4 %
MAN DIFF?: NORMAL
MCHC RBC-ENTMCNC: 29.8 PG
MCHC RBC-ENTMCNC: 33 GM/DL
MCV RBC AUTO: 90.3 FL
MONOCYTES # BLD AUTO: 0.49 K/UL
MONOCYTES NFR BLD AUTO: 6.2 %
NEUTROPHILS # BLD AUTO: 5.34 K/UL
NEUTROPHILS NFR BLD AUTO: 67.7 %
NITRITE UR QL STRIP: NORMAL
PH UR STRIP: 5.5
PLATELET # BLD AUTO: 241 K/UL
POTASSIUM SERPL-SCNC: 3.8 MMOL/L
PROT SERPL-MCNC: 7.1 G/DL
PROT UR STRIP-MCNC: NORMAL
RBC # BLD: 4.33 M/UL
RBC # FLD: 13.2 %
SODIUM SERPL-SCNC: 138 MMOL/L
SP GR UR STRIP: 1.02
T VAGINALIS VAG QL WET PREP: NOT DETECTED
WBC # FLD AUTO: 7.89 K/UL

## 2024-03-25 NOTE — PHYSICAL EXAM
[No Acute Distress] : no acute distress [Normal Sclera/Conjunctiva] : normal sclera/conjunctiva [No JVD] : no jugular venous distention [No Respiratory Distress] : no respiratory distress  [Normal Rate] : normal rate  [No Accessory Muscle Use] : no accessory muscle use [No Carotid Bruits] : no carotid bruits [Regular Rhythm] : with a regular rhythm [Soft] : abdomen soft [No Abdominal Bruit] : a ~M bruit was not heard ~T in the abdomen [Non Tender] : non-tender [Normal Anterior Cervical Nodes] : no anterior cervical lymphadenopathy [No Spinal Tenderness] : no spinal tenderness [No CVA Tenderness] : no CVA  tenderness [No Joint Swelling] : no joint swelling [Grossly Normal Strength/Tone] : grossly normal strength/tone [No Rash] : no rash [Coordination Grossly Intact] : coordination grossly intact [No Focal Deficits] : no focal deficits [Normal] : affect was normal and insight and judgment were intact

## 2024-03-27 LAB
C TRACH RRNA SPEC QL NAA+PROBE: NOT DETECTED
N GONORRHOEA RRNA SPEC QL NAA+PROBE: NOT DETECTED
SOURCE AMPLIFICATION: NORMAL

## 2024-03-28 ENCOUNTER — OUTPATIENT (OUTPATIENT)
Dept: OUTPATIENT SERVICES | Facility: HOSPITAL | Age: 19
LOS: 1 days | End: 2024-03-28
Payer: SELF-PAY

## 2024-03-28 ENCOUNTER — RESULT REVIEW (OUTPATIENT)
Age: 19
End: 2024-03-28

## 2024-03-28 DIAGNOSIS — N93.0 POSTCOITAL AND CONTACT BLEEDING: ICD-10-CM

## 2024-03-28 DIAGNOSIS — Z00.00 ENCOUNTER FOR GENERAL ADULT MEDICAL EXAMINATION WITHOUT ABNORMAL FINDINGS: ICD-10-CM

## 2024-03-28 PROCEDURE — 76830 TRANSVAGINAL US NON-OB: CPT | Mod: 26

## 2024-03-28 PROCEDURE — 76856 US EXAM PELVIC COMPLETE: CPT

## 2024-03-28 PROCEDURE — 76856 US EXAM PELVIC COMPLETE: CPT | Mod: 26

## 2024-03-28 PROCEDURE — 76830 TRANSVAGINAL US NON-OB: CPT

## 2024-03-29 NOTE — REVIEW OF SYSTEMS
[Dysuria] : dysuria [Negative] : Heme/Lymph [Incontinence] : no incontinence [Nocturia] : no nocturia [Joint Pain] : no joint pain [Joint Stiffness] : no joint stiffness

## 2024-03-29 NOTE — HEALTH RISK ASSESSMENT
[Good] : ~his/her~ current health as good [No] : No [0] : 2) Feeling down, depressed, or hopeless: Not at all (0) [PHQ-2 Negative - No further assessment needed] : PHQ-2 Negative - No further assessment needed [Never] : Never [[id number]] : 0

## 2024-03-29 NOTE — HISTORY OF PRESENT ILLNESS
[FreeTextEntry1] : CPE  [de-identified] : 19 years  old female  with no significant past medical history presents to the clinic for CPE. Pt is complaining of episodic postcoital bleeding and burning while passing urine. Pt denied fever, chills, nausea, vomiting, and pelvic pain. Pt has no other complaints. Pt isd not takiong any medication and denied Allergies to medication or food.

## 2024-04-25 ENCOUNTER — APPOINTMENT (OUTPATIENT)
Dept: FAMILY MEDICINE | Facility: HOSPITAL | Age: 19
End: 2024-04-25

## 2024-04-25 ENCOUNTER — OUTPATIENT (OUTPATIENT)
Dept: OUTPATIENT SERVICES | Facility: HOSPITAL | Age: 19
LOS: 1 days | End: 2024-04-25

## 2024-04-25 VITALS
TEMPERATURE: 98.6 F | OXYGEN SATURATION: 98 % | SYSTOLIC BLOOD PRESSURE: 110 MMHG | HEIGHT: 60 IN | BODY MASS INDEX: 21.01 KG/M2 | HEART RATE: 70 BPM | WEIGHT: 107 LBS | DIASTOLIC BLOOD PRESSURE: 70 MMHG | RESPIRATION RATE: 14 BRPM

## 2024-04-25 DIAGNOSIS — N93.0 POSTCOITAL AND CONTACT BLEEDING: ICD-10-CM

## 2024-04-25 DIAGNOSIS — Z00.00 ENCOUNTER FOR GENERAL ADULT MEDICAL EXAMINATION WITHOUT ABNORMAL FINDINGS: ICD-10-CM

## 2024-04-25 PROCEDURE — G0463: CPT

## 2024-04-29 PROBLEM — N93.0 PCB (POST COITAL BLEEDING): Status: ACTIVE | Noted: 2024-03-22

## 2024-05-01 NOTE — HISTORY OF PRESENT ILLNESS
[FreeTextEntry1] : Post coital bleeding follow-up  [de-identified] : 19 year old female presents to the clinic for postcoital bleeding follow-up. Pt said thats he is not seeing post coital bleeding any more and she is feeling well. US pelvis is wnl and BV GC are negative.  Pt denied pelvic pain , vaginal discharge or dysuria.

## 2025-07-15 ENCOUNTER — EMERGENCY (EMERGENCY)
Facility: HOSPITAL | Age: 20
LOS: 1 days | End: 2025-07-15
Attending: EMERGENCY MEDICINE | Admitting: EMERGENCY MEDICINE
Payer: MEDICAID

## 2025-07-15 VITALS
DIASTOLIC BLOOD PRESSURE: 65 MMHG | TEMPERATURE: 99 F | OXYGEN SATURATION: 99 % | HEIGHT: 62 IN | RESPIRATION RATE: 18 BRPM | WEIGHT: 102.96 LBS | HEART RATE: 95 BPM | SYSTOLIC BLOOD PRESSURE: 97 MMHG

## 2025-07-15 LAB
APPEARANCE UR: CLEAR — SIGNIFICANT CHANGE UP
BILIRUB UR-MCNC: NEGATIVE — SIGNIFICANT CHANGE UP
COLOR SPEC: YELLOW — SIGNIFICANT CHANGE UP
DIFF PNL FLD: ABNORMAL
EPI CELLS # UR: PRESENT
GLUCOSE UR QL: NEGATIVE MG/DL — SIGNIFICANT CHANGE UP
KETONES UR QL: ABNORMAL MG/DL
LEUKOCYTE ESTERASE UR-ACNC: ABNORMAL
NITRITE UR-MCNC: NEGATIVE — SIGNIFICANT CHANGE UP
PH UR: 5.5 — SIGNIFICANT CHANGE UP (ref 5–8)
PROT UR-MCNC: NEGATIVE MG/DL — SIGNIFICANT CHANGE UP
RBC CASTS # UR COMP ASSIST: 1 /HPF — SIGNIFICANT CHANGE UP (ref 0–4)
SP GR SPEC: 1.03 — SIGNIFICANT CHANGE UP (ref 1–1.03)
UROBILINOGEN FLD QL: 1 MG/DL — SIGNIFICANT CHANGE UP (ref 0.2–1)
WBC UR QL: 2 /HPF — SIGNIFICANT CHANGE UP (ref 0–5)

## 2025-07-15 PROCEDURE — 99283 EMERGENCY DEPT VISIT LOW MDM: CPT

## 2025-07-15 PROCEDURE — 99284 EMERGENCY DEPT VISIT MOD MDM: CPT

## 2025-07-15 PROCEDURE — 87086 URINE CULTURE/COLONY COUNT: CPT

## 2025-07-15 PROCEDURE — 81001 URINALYSIS AUTO W/SCOPE: CPT

## 2025-07-15 RX ORDER — PHENAZOPYRIDINE HCL 100 MG
200 TABLET ORAL ONCE
Refills: 0 | Status: COMPLETED | OUTPATIENT
Start: 2025-07-15 | End: 2025-07-15

## 2025-07-15 RX ORDER — CEFUROXIME SODIUM 1.5 G
250 VIAL (EA) INJECTION ONCE
Refills: 0 | Status: COMPLETED | OUTPATIENT
Start: 2025-07-15 | End: 2025-07-15

## 2025-07-15 RX ADMIN — Medication 250 MILLIGRAM(S): at 10:59

## 2025-07-15 RX ADMIN — Medication 200 MILLIGRAM(S): at 11:08

## 2025-07-15 NOTE — ED ADULT NURSE NOTE - OBJECTIVE STATEMENT
20 yr old female to ED for complaints of abdominal pain x 3 days. patient also reports nausea, denies fevers, chills, vomiting, diarrhea.

## 2025-07-15 NOTE — ED ADULT TRIAGE NOTE - CHIEF COMPLAINT QUOTE
Patient to ED from home for complaints of abdominal pain x 3 days. patient also reports nausea, denies fevers, chills, vomiting, diarrhea.

## 2025-07-15 NOTE — ED PROVIDER NOTE - CLINICAL SUMMARY MEDICAL DECISION MAKING FREE TEXT BOX
20-year-old female with no past significant medical history presents with urinary complaints for past couple days and has on intermittent for months, lab, med

## 2025-07-15 NOTE — ED PROVIDER NOTE - CARDIAC, MLM
Please ensure adequate water throughout the day.  Please ensure taking regular potty breaks, about every 2-3h, especially prior to activities. Encourage listening to your body', can try a sticker chart if needing positive reinforcement.     Follow up as needed for re-evaluation as needed, for any urine symptoms or further concerns.    Normal rate, regular rhythm.  Heart sounds S1, S2.  No murmurs, rubs or gallops.

## 2025-07-15 NOTE — ED PROVIDER NOTE - OBJECTIVE STATEMENT
20-year-old female with no past significant medical history presents with urinary complaints for past couple days and has on intermittent for months.  [place].  History of dysuria, urinary urgency and frequency.  No fever or chills.  No vaginal discharge.  Normal bowel movement this morning.  Last menstrual period was 2 weeks ago.

## 2025-07-15 NOTE — ED PROVIDER NOTE - CARE PROVIDER_API CALL
Naomi Dunne  [facility]  [address] [place] [address], [address]  [place] [address]  Phone: [phone]  Fax: [phone]  Follow Up Time: 1-3 Days

## 2025-07-16 ENCOUNTER — RESULT CHARGE (OUTPATIENT)
Age: 20
End: 2025-07-16

## 2025-07-16 ENCOUNTER — APPOINTMENT (OUTPATIENT)
Age: 20
End: 2025-07-16

## 2025-07-16 ENCOUNTER — OUTPATIENT (OUTPATIENT)
Dept: OUTPATIENT SERVICES | Facility: HOSPITAL | Age: 20
LOS: 1 days | End: 2025-07-16
Payer: SELF-PAY

## 2025-07-16 VITALS
TEMPERATURE: 98 F | DIASTOLIC BLOOD PRESSURE: 62 MMHG | WEIGHT: 108 LBS | RESPIRATION RATE: 16 BRPM | SYSTOLIC BLOOD PRESSURE: 95 MMHG | OXYGEN SATURATION: 98 % | HEART RATE: 60 BPM

## 2025-07-16 DIAGNOSIS — Z00.00 ENCOUNTER FOR GENERAL ADULT MEDICAL EXAMINATION WITHOUT ABNORMAL FINDINGS: ICD-10-CM

## 2025-07-16 DIAGNOSIS — R10.33 PERIUMBILICAL PAIN: ICD-10-CM

## 2025-07-16 LAB
BILIRUB UR QL STRIP: NEGATIVE
CLARITY UR: CLEAR
COLLECTION METHOD: NORMAL
CULTURE RESULTS: SIGNIFICANT CHANGE UP
GLUCOSE UR-MCNC: NEGATIVE
HCG UR QL: 0.2 EU/DL
HGB UR QL STRIP.AUTO: NORMAL
KETONES UR-MCNC: NEGATIVE
LEUKOCYTE ESTERASE UR QL STRIP: NEGATIVE
NITRITE UR QL STRIP: NEGATIVE
PH UR STRIP: 5.5
PROT UR STRIP-MCNC: NEGATIVE
SP GR UR STRIP: 1.03
SPECIMEN SOURCE: SIGNIFICANT CHANGE UP

## 2025-07-16 PROCEDURE — 84443 ASSAY THYROID STIM HORMONE: CPT

## 2025-07-16 PROCEDURE — 83690 ASSAY OF LIPASE: CPT

## 2025-07-16 PROCEDURE — 81002 URINALYSIS NONAUTO W/O SCOPE: CPT

## 2025-07-16 PROCEDURE — 86480 TB TEST CELL IMMUN MEASURE: CPT

## 2025-07-16 PROCEDURE — 83036 HEMOGLOBIN GLYCOSYLATED A1C: CPT

## 2025-07-16 PROCEDURE — 85025 COMPLETE CBC W/AUTO DIFF WBC: CPT

## 2025-07-16 PROCEDURE — 80053 COMPREHEN METABOLIC PANEL: CPT

## 2025-07-16 PROCEDURE — G0463: CPT

## 2025-07-16 PROCEDURE — 80061 LIPID PANEL: CPT

## 2025-07-16 PROCEDURE — 82150 ASSAY OF AMYLASE: CPT

## 2025-07-16 PROCEDURE — 36415 COLL VENOUS BLD VENIPUNCTURE: CPT

## 2025-07-18 LAB
ALBUMIN SERPL ELPH-MCNC: 4.8 G/DL
ALP BLD-CCNC: 85 U/L
ALT SERPL-CCNC: 19 U/L
AMYLASE/CREAT SERPL: 71 U/L
ANION GAP SERPL CALC-SCNC: 16 MMOL/L
AST SERPL-CCNC: 22 U/L
BASOPHILS # BLD AUTO: 0.03 K/UL
BASOPHILS NFR BLD AUTO: 0.4 %
BILIRUB SERPL-MCNC: 0.3 MG/DL
BUN SERPL-MCNC: 12 MG/DL
CALCIUM SERPL-MCNC: 9.8 MG/DL
CHLORIDE SERPL-SCNC: 108 MMOL/L
CHOLEST SERPL-MCNC: 105 MG/DL
CO2 SERPL-SCNC: 18 MMOL/L
CREAT SERPL-MCNC: 0.68 MG/DL
EGFRCR SERPLBLD CKD-EPI 2021: 126 ML/MIN/1.73M2
EOSINOPHIL # BLD AUTO: 0.22 K/UL
EOSINOPHIL NFR BLD AUTO: 3.1 %
ESTIMATED AVERAGE GLUCOSE: 97 MG/DL
GLUCOSE SERPL-MCNC: 86 MG/DL
HBA1C MFR BLD HPLC: 5 %
HCT VFR BLD CALC: 38.1 %
HDLC SERPL-MCNC: 40 MG/DL
HGB BLD-MCNC: 12.6 G/DL
IMM GRANULOCYTES NFR BLD AUTO: 0.3 %
LDLC SERPL-MCNC: 55 MG/DL
LPL SERPL-CCNC: 30 U/L
LYMPHOCYTES # BLD AUTO: 1.39 K/UL
LYMPHOCYTES NFR BLD AUTO: 19.6 %
MAN DIFF?: NORMAL
MCHC RBC-ENTMCNC: 29.3 PG
MCHC RBC-ENTMCNC: 33.1 G/DL
MCV RBC AUTO: 88.6 FL
MONOCYTES # BLD AUTO: 0.35 K/UL
MONOCYTES NFR BLD AUTO: 4.9 %
NEUTROPHILS # BLD AUTO: 5.07 K/UL
NEUTROPHILS NFR BLD AUTO: 71.7 %
NONHDLC SERPL-MCNC: 65 MG/DL
PLATELET # BLD AUTO: 230 K/UL
POTASSIUM SERPL-SCNC: 4.4 MMOL/L
PROT SERPL-MCNC: 7.3 G/DL
RBC # BLD: 4.3 M/UL
RBC # FLD: 13.2 %
SODIUM SERPL-SCNC: 141 MMOL/L
TRIGL SERPL-MCNC: 33 MG/DL
TSH SERPL-ACNC: 1.22 UIU/ML
WBC # FLD AUTO: 7.08 K/UL

## 2025-07-20 LAB
M TB IFN-G BLD-IMP: NEGATIVE
QUANTIFERON TB PLUS MITOGEN MINUS NIL: 7.63 IU/ML
QUANTIFERON TB PLUS NIL: 0.04 IU/ML
QUANTIFERON TB PLUS TB1 MINUS NIL: 0 IU/ML
QUANTIFERON TB PLUS TB2 MINUS NIL: 0.02 IU/ML

## 2025-07-31 ENCOUNTER — OUTPATIENT (OUTPATIENT)
Dept: OUTPATIENT SERVICES | Facility: HOSPITAL | Age: 20
LOS: 1 days | End: 2025-07-31
Payer: SELF-PAY

## 2025-07-31 ENCOUNTER — APPOINTMENT (OUTPATIENT)
Age: 20
End: 2025-07-31

## 2025-07-31 VITALS
HEART RATE: 66 BPM | RESPIRATION RATE: 16 BRPM | SYSTOLIC BLOOD PRESSURE: 113 MMHG | HEIGHT: 60 IN | BODY MASS INDEX: 20.42 KG/M2 | WEIGHT: 104 LBS | DIASTOLIC BLOOD PRESSURE: 74 MMHG | TEMPERATURE: 98 F | OXYGEN SATURATION: 97 %

## 2025-07-31 DIAGNOSIS — Z13.1 ENCOUNTER FOR SCREENING FOR DIABETES MELLITUS: ICD-10-CM

## 2025-07-31 DIAGNOSIS — Z13.29 ENCOUNTER FOR SCREENING FOR OTHER SUSPECTED ENDOCRINE DISORDER: ICD-10-CM

## 2025-07-31 DIAGNOSIS — Z87.898 PERSONAL HISTORY OF OTHER SPECIFIED CONDITIONS: ICD-10-CM

## 2025-07-31 DIAGNOSIS — R07.9 CHEST PAIN, UNSPECIFIED: ICD-10-CM

## 2025-07-31 DIAGNOSIS — Z13.220 ENCOUNTER FOR SCREENING FOR LIPOID DISORDERS: ICD-10-CM

## 2025-07-31 DIAGNOSIS — Z00.00 ENCOUNTER FOR GENERAL ADULT MEDICAL EXAMINATION W/OUT ABNORMAL FINDINGS: ICD-10-CM

## 2025-07-31 DIAGNOSIS — R10.33 PERIUMBILICAL PAIN: ICD-10-CM

## 2025-07-31 DIAGNOSIS — Z00.00 ENCOUNTER FOR GENERAL ADULT MEDICAL EXAMINATION WITHOUT ABNORMAL FINDINGS: ICD-10-CM

## 2025-07-31 DIAGNOSIS — Z11.1 ENCOUNTER FOR SCREENING FOR RESPIRATORY TUBERCULOSIS: ICD-10-CM

## 2025-07-31 PROCEDURE — G0463: CPT

## 2025-08-08 ENCOUNTER — APPOINTMENT (OUTPATIENT)
Age: 20
End: 2025-08-08